# Patient Record
Sex: MALE | URBAN - METROPOLITAN AREA
[De-identification: names, ages, dates, MRNs, and addresses within clinical notes are randomized per-mention and may not be internally consistent; named-entity substitution may affect disease eponyms.]

---

## 2021-08-10 ENCOUNTER — OFFICE VISIT (OUTPATIENT)
Dept: URBAN - METROPOLITAN AREA CLINIC 6 | Facility: CLINIC | Age: 61
End: 2021-08-10
Payer: MEDICARE

## 2021-08-10 DIAGNOSIS — H11.062 RECURRENT PTERYGIUM OF LEFT EYE: Primary | ICD-10-CM

## 2021-08-10 DIAGNOSIS — H04.123 DRY EYE SYNDROME OF BILATERAL LACRIMAL GLANDS: ICD-10-CM

## 2021-08-10 DIAGNOSIS — H40.1134 BILATERAL PRIMARY OPEN-ANGLE GLAUCOMA, INDETERMINATE STAGE: ICD-10-CM

## 2021-08-10 PROCEDURE — 92012 INTRM OPH EXAM EST PATIENT: CPT | Performed by: OPTOMETRIST

## 2021-08-10 PROCEDURE — 92083 EXTENDED VISUAL FIELD XM: CPT | Performed by: OPTOMETRIST

## 2021-08-10 PROCEDURE — 92133 CPTRZD OPH DX IMG PST SGM ON: CPT | Performed by: OPTOMETRIST

## 2021-08-10 RX ORDER — METFORMIN HYDROCHLORIDE 1000 MG/1
TABLET, FILM COATED ORAL
Refills: 0 | Status: INACTIVE
Start: 2021-08-10 | End: 2022-01-21

## 2021-08-10 ASSESSMENT — INTRAOCULAR PRESSURE
OS: 14
OD: 18

## 2021-08-10 NOTE — IMPRESSION/PLAN
Impression: Recurrent pterygium of left eye: H11.062. Plan: Pterygium OS (H11.003): advised patient to avoid UV exposure with a hat and sunglasses when outdoors. Use artificial tears PRN. Return for any change in size, decreased vision, or other problems or concerns.

## 2021-08-10 NOTE — IMPRESSION/PLAN
Impression: Conjunctival hemorrhage, left eye: H11.32. Plan: Subconjunctival hemorrhage OS (H11.33): advised patient that this is likely a benign event and will clear in approximately three weeks. Advised to return immediately for any other symptoms such as change in vision. Advised patient to make sure blood pressure is controlled. Recommend to keep a blood pressure diary for 1 week and discuss the results with the PCP.

## 2021-08-10 NOTE — IMPRESSION/PLAN
Impression: Bilateral primary open-angle glaucoma, indeterminate stage: H40.1134. Plan: IOP 18/14 today with Icare. Attempted GAT but unable due to unintentional uncooperation by patient. Continue Timolol BID OU and Azopt BID OU, pt. reports compliance with both drops. HVF 24-2 low reliability OD due to high false positives, reliable OS. OD shows dense central depression, OS has a infero-nasal step/arcuate. [facility]71 with thinning superiorly OU.

## 2021-09-14 ENCOUNTER — OFFICE VISIT (OUTPATIENT)
Dept: URBAN - METROPOLITAN AREA CLINIC 6 | Facility: CLINIC | Age: 61
End: 2021-09-14
Payer: MEDICARE

## 2021-09-14 DIAGNOSIS — H11.32 SUBCONJUNCTIVAL HEMORRHAGE OF LEFT EYE: ICD-10-CM

## 2021-09-14 DIAGNOSIS — L98.0 PYOGENIC GRANULOMA: ICD-10-CM

## 2021-09-14 DIAGNOSIS — S05.02XD INJURY OF CONJUNCTIVA AND CORNEAL ABRASION WITHOUT FOREIGN BODY, LEFT EYE, SUBSEQUENT ENCOUNTER: ICD-10-CM

## 2021-09-14 PROCEDURE — 92012 INTRM OPH EXAM EST PATIENT: CPT | Performed by: OPTOMETRIST

## 2021-09-14 RX ORDER — ERYTHROMYCIN 5 MG/G
OINTMENT OPHTHALMIC
Qty: 3.5 | Refills: 2 | Status: ACTIVE
Start: 2021-09-14

## 2021-09-14 ASSESSMENT — INTRAOCULAR PRESSURE
OD: 19
OS: 26

## 2021-09-14 NOTE — IMPRESSION/PLAN
Impression: Injury of conjunctiva and corneal abrasion without foreign body, left eye, subsequent encounter: S05. 02XD. Plan: Pt has 4 mm epithelial defect inferior left cornea. Recommend Erythromycin QID OS.   Recheck Friday AM.

## 2021-09-14 NOTE — IMPRESSION/PLAN
Impression: Dry eye syndrome of bilateral lacrimal glands: H04.123. Plan: Recommend frequent lubrication.

## 2021-09-14 NOTE — IMPRESSION/PLAN
Impression: Pyogenic granuloma: L98.0. Plan: Possibly at injection site OS. Hold on topical steroid until cornea epidefect heals.

## 2021-09-17 ENCOUNTER — OFFICE VISIT (OUTPATIENT)
Dept: URBAN - METROPOLITAN AREA CLINIC 6 | Facility: CLINIC | Age: 61
End: 2021-09-17
Payer: MEDICARE

## 2021-09-17 PROCEDURE — 92012 INTRM OPH EXAM EST PATIENT: CPT | Performed by: OPHTHALMOLOGY

## 2021-09-17 RX ORDER — DOXYCYCLINE 100 MG/1
100 MG CAPSULE ORAL
Qty: 14 | Refills: 1 | Status: ACTIVE
Start: 2021-09-17

## 2021-09-17 RX ORDER — OFLOXACIN 3 MG/ML
0.3 % SOLUTION/ DROPS OPHTHALMIC
Qty: 5 | Refills: 0 | Status: ACTIVE
Start: 2021-09-17

## 2021-09-17 RX ORDER — SODIUM CHLORIDE 50 MG/ML
5 % SOLUTION OPHTHALMIC
Qty: 10 | Refills: 0 | Status: ACTIVE
Start: 2021-09-17

## 2021-09-17 ASSESSMENT — INTRAOCULAR PRESSURE
OS: 10
OD: 14

## 2021-09-17 NOTE — IMPRESSION/PLAN
Impression: Injury of conjunctiva and corneal abrasion without foreign body, left eye, subsequent encounter: S05. 02XD. Plan: Bandage CTL placed. Start jaqui/ofloxacin gtts QID and doxycycline 100 mg po QD. Has a neurotrophic component.

## 2021-09-24 ENCOUNTER — OFFICE VISIT (OUTPATIENT)
Dept: URBAN - METROPOLITAN AREA CLINIC 6 | Facility: CLINIC | Age: 61
End: 2021-09-24
Payer: MEDICARE

## 2021-09-24 DIAGNOSIS — E11.3513 DIABETES MELLITUS TYPE 2 WITH PROLIFERATIVE RETINOPATHY WITH MACULAR EDEMA, BILATERAL: ICD-10-CM

## 2021-09-24 PROCEDURE — 92012 INTRM OPH EXAM EST PATIENT: CPT | Performed by: OPHTHALMOLOGY

## 2021-09-24 RX ORDER — DOXYCYCLINE 100 MG/1
100 MG CAPSULE ORAL
Qty: 14 | Refills: 1 | Status: ACTIVE
Start: 2021-09-24

## 2021-09-24 RX ORDER — NEOMYCIN SULFATE, POLYMYXIN B SULFATE AND DEXAMETHASONE 3.5; 10000; 1 MG/G; [USP'U]/G; MG/G
OINTMENT OPHTHALMIC
Qty: 3.5 | Refills: 0 | Status: ACTIVE
Start: 2021-09-24

## 2021-09-24 ASSESSMENT — INTRAOCULAR PRESSURE
OD: 12
OS: 15

## 2021-09-24 NOTE — IMPRESSION/PLAN
Impression: Punctate keratitis, bilateral: H16.143. Plan: staph marginal keratitis ou. Start maxitrol andi BID and doxycycline 100 mg pills PO.

## 2021-09-24 NOTE — IMPRESSION/PLAN
Impression: Bilateral primary open-angle glaucoma, indeterminate stage: H40.1134. Plan: Primary Open Angle Glaucoma, moderate stage- IOP at target with azopt BID ou. Discussed with the patient that lack of compliance with drops/treatment and follow up visits can result in severe vision loss or blindness.  CCrx

## 2021-09-24 NOTE — IMPRESSION/PLAN
Impression: Diabetes mellitus Type 2 with proliferative retinopathy with macular edema, bilateral: H56.1574. Plan: Proliferative diabetic retinopathy with clinically significant macular edema both eyes (Q98.2879):  Discussed the pathophysiology of diabetes and its effect on the eye. Stressed the importance of strong glucose control. Reviewed the relevant diabetic retinal studies with the patient. Under care of Dr. Debbie Weeks and getting injections OS. CCrx with him.

## 2021-10-18 ENCOUNTER — OFFICE VISIT (OUTPATIENT)
Dept: URBAN - METROPOLITAN AREA CLINIC 6 | Facility: CLINIC | Age: 61
End: 2021-10-18
Payer: MEDICARE

## 2021-10-18 DIAGNOSIS — H16.143 PUNCTATE KERATITIS, BILATERAL: Primary | ICD-10-CM

## 2021-10-18 PROCEDURE — 92012 INTRM OPH EXAM EST PATIENT: CPT | Performed by: OPHTHALMOLOGY

## 2021-10-18 RX ORDER — DOXYCYCLINE 50 MG/1
50 MG TABLET, FILM COATED ORAL
Qty: 30 | Refills: 1 | Status: ACTIVE
Start: 2021-10-18

## 2021-10-18 ASSESSMENT — INTRAOCULAR PRESSURE
OD: 12
OS: 14

## 2021-10-18 NOTE — IMPRESSION/PLAN
Impression: Punctate keratitis, bilateral: H16.143. Plan: Staph marginal keratitis. resolved. Taper maxitrol andi QHS ou till runs out and doxy 50 mg po daily for another month. Cont lid hygiene.

## 2021-10-18 NOTE — IMPRESSION/PLAN
Impression: Bilateral primary open-angle glaucoma, indeterminate stage: H40.1134. Plan: Primary Open Angle Glaucoma, moderate stage- IOP at target with timolol/azopt BID ou. Discussed with the patient that lack of compliance with drops/treatment and follow up visits can result in severe vision loss or blindness.  CCrx

## 2022-01-21 ENCOUNTER — OFFICE VISIT (OUTPATIENT)
Dept: URBAN - METROPOLITAN AREA CLINIC 6 | Facility: CLINIC | Age: 62
End: 2022-01-21
Payer: MEDICARE

## 2022-01-21 PROCEDURE — 92133 CPTRZD OPH DX IMG PST SGM ON: CPT | Performed by: OPHTHALMOLOGY

## 2022-01-21 PROCEDURE — 92012 INTRM OPH EXAM EST PATIENT: CPT | Performed by: OPHTHALMOLOGY

## 2022-01-21 ASSESSMENT — INTRAOCULAR PRESSURE
OS: 19
OD: 18

## 2022-01-21 NOTE — IMPRESSION/PLAN
Impression: Bilateral primary open-angle glaucoma, indeterminate stage: H40.1134. Plan: Primary Open Angle Glaucoma, moderate stage- IOP at target with timolol/azopt BID ou. RNFL today with stable thinning. Discussed with the patient that lack of compliance with drops/treatment and follow up visits can result in severe vision loss or blindness.  CCrx Plan VF/Dil in 6 mo, sooner prn

## 2022-01-21 NOTE — IMPRESSION/PLAN
Impression: Diabetes mellitus Type 2 with proliferative retinopathy with macular edema, bilateral: Y79.1699. Plan: Proliferative diabetic retinopathy with clinically significant macular edema both eyes (O01.1515):  Discussed the pathophysiology of diabetes and its effect on the eye. Stressed the importance of strong glucose control. Reviewed the relevant diabetic retinal studies with the patient. Under care of Dr. Bruna Ocampo and getting injections OS. CCrx with him.

## 2022-07-19 ENCOUNTER — OFFICE VISIT (OUTPATIENT)
Dept: URBAN - METROPOLITAN AREA CLINIC 6 | Facility: CLINIC | Age: 62
End: 2022-07-19
Payer: COMMERCIAL

## 2022-07-19 DIAGNOSIS — E11.3553 TYPE 2 DIABETES MELLITUS W/ STABLE PROLIFERATIVE DIABETIC RETINOPATHY OF BILATERAL EYES: Primary | ICD-10-CM

## 2022-07-19 DIAGNOSIS — H40.1132 PRIMARY OPEN-ANGLE GLAUCOMA, BILATERAL, MODERATE STAGE: ICD-10-CM

## 2022-07-19 DIAGNOSIS — H04.123 TEAR FILM INSUFFICIENCY OF BILATERAL LACRIMAL GLANDS: ICD-10-CM

## 2022-07-19 PROCEDURE — 92014 COMPRE OPH EXAM EST PT 1/>: CPT | Performed by: OPHTHALMOLOGY

## 2022-07-19 ASSESSMENT — INTRAOCULAR PRESSURE
OS: 17
OD: 13

## 2022-07-19 NOTE — IMPRESSION/PLAN
Impression: Primary open-angle glaucoma, bilateral, moderate stage: E94.8199. Plan: Primary Open Angle Glaucoma OU, moderate stage. Using Azopt and Timolol.   CCRx (rx by dr May Shore)

## 2022-07-19 NOTE — IMPRESSION/PLAN
Impression: Type 2 diabetes mellitus w/ stable proliferative diabetic retinopathy of bilateral eyes: E11.3553. Plan: Diabetes Mellitus Type II with regressed Proliferative Diabetic Retinopathy (T01.2812)- Continue care with Dr. Austin Walden.  Next apt in Sept 2022

## 2022-12-01 ENCOUNTER — OFFICE VISIT (OUTPATIENT)
Dept: URBAN - METROPOLITAN AREA CLINIC 6 | Facility: CLINIC | Age: 62
End: 2022-12-01
Payer: COMMERCIAL

## 2022-12-01 DIAGNOSIS — B00.9 HERPESVIRAL INFECTION, UNSPECIFIED: ICD-10-CM

## 2022-12-01 DIAGNOSIS — H40.1132 PRIMARY OPEN-ANGLE GLAUCOMA, BILATERAL, MODERATE STAGE: Primary | ICD-10-CM

## 2022-12-01 PROCEDURE — 92012 INTRM OPH EXAM EST PATIENT: CPT | Performed by: OPTOMETRIST

## 2022-12-01 RX ORDER — ACYCLOVIR 800 MG/1
800 MG TABLET ORAL
Qty: 14 | Refills: 0 | Status: INACTIVE
Start: 2022-12-01 | End: 2022-12-10

## 2022-12-01 RX ORDER — GANCICLOVIR 1.5 MG/G
0.15 % GEL OPHTHALMIC
Qty: 5 | Refills: 0 | Status: ACTIVE
Start: 2022-12-01

## 2022-12-01 RX ORDER — NEOMYCIN SULFATE, POLYMYXIN B SULFATE AND DEXAMETHASONE 3.5; 10000; 1 MG/ML; [USP'U]/ML; MG/ML
SUSPENSION OPHTHALMIC
Qty: 5 | Refills: 0 | Status: ACTIVE
Start: 2022-12-01

## 2022-12-01 RX ORDER — TRIFLURIDINE 1 G/100ML
1 % SOLUTION OPHTHALMIC
Qty: 7.5 | Refills: 0 | Status: ACTIVE
Start: 2022-12-01

## 2022-12-01 ASSESSMENT — INTRAOCULAR PRESSURE
OD: 13
OS: 13

## 2022-12-01 NOTE — IMPRESSION/PLAN
Impression: Primary open-angle glaucoma, bilateral, moderate stage: T12.9667. Plan: Primary Open Angle Glaucoma OU, moderate stage. Using Brinzolamide and Timolol. CCRx (rx by [facility]). Discussed with the patient that lack of compliance with drops/treatment and follow up visits can result in severe vision loss or blindness.

## 2022-12-01 NOTE — IMPRESSION/PLAN
Impression: Herpesviral infection, unspecified: B00.9. Plan: OD-  HSV Keratitis - Recommend Ciara [place] as this is the best medication available for this condition, however, it is understood that Ciara [place] is often expensive and/or difficult to obtain. Triflurodine is an alternative which may be used if unable to get Ciara [place]. Ciara [place] is dosed 5x per day and Viroptic 7-9x per day. Start acyclovir 800mg BID x 7 days then stop.